# Patient Record
(demographics unavailable — no encounter records)

---

## 2024-12-11 NOTE — HISTORY OF PRESENT ILLNESS
[FreeTextEntry1] : 41 y/o female for annual  c/o aub bleedinhg x 3-5 days between epriods no pain  has IUD  upreg neg [Y] : Patient uses contraception [IUD] : has an intrauterine device [Mammogramdate] : will order [PapSmeardate] : 23

## 2024-12-11 NOTE — PHYSICAL EXAM
[Examination Of The Breasts] : a normal appearance [No Masses] : no breast masses were palpable [Labia Majora] : normal [Labia Minora] : normal [Normal] : normal [Uterine Adnexae] : normal [FreeTextEntry4] : iud string easily seen

## 2025-01-15 NOTE — HISTORY OF PRESENT ILLNESS
[FreeTextEntry1] : 39 y/o female for f/u aub  pt reports no longer banl bleeding  bx secretory tvs normal hb a1c pre-diabetic told to pt  pt has eye redness-will give meds-if no improvement in 5-7 days needs optho appt-pt understands and agrees

## 2025-02-26 NOTE — HISTORY OF PRESENT ILLNESS
[FreeTextEntry1] : Idalmis is a 41 y/o F who is here for new dx of Right ADH, FEA, and PASH. Pt speaks Azeri and is using an  ([id number] Mr. Mora) Pt denies feeling any palpable mass by herself. Denies nipple discharge and skin changes.   No FHx of breast and ovarian cancer  Her imaging is as follows: 01/23/2025 - B/L Mammo--> BIRADS0 -breasts are heterogeneously dense, which may obscure small masses. -There is an oval mass with associated calcifications seen in the upper outer quadrant of the right breast. -There is an asymmetry seen in the CC view only seen in the medial right breast at middle third, depth region. -There is an asymmetry seen in the MLO view only seen in the superior left breast.  01/31/2025 - B/L Dx Mammo and US--> BIRADS4 -breasts are heterogeneously dense, which may obscure small masses. Right breast: -Group of heterogeneous calcifications in the upper outer quadrant of the right breast is suspicious. Stereotactic biopsy is recommended. -The previously identified asymmetry in the medial right breast effaces upon spot compression, consistent with superimposed normal fibroglandular tissue. Left breast: -The previously identified superior left breast asymmetry persists upon spot compression. Short interval follow-up is recommended. Recommendation: Stereotactic guided biopsy.  02/14/2025 Right Stero Bx Breast, right calcifications, stereotactic guided vacuum-assisted needle core biopsies:  ([employer] mini-cork) - Mucocele-like lesion, 6.0 mm (microscopic measurement) in this biopsy material, associated with both atypical ductal hyperplasia (ADH) and flat epithelial atypia (FEA), along with phosphate microcalcifications. - Pseudoangiomatous stromal hyperplasia (PASH) and proliferative type fibrocystic changes. - Radiographic/pathologic correlation with the specimen's digital image reviewed on PACS is performed. Findings are benign high risk concordant.

## 2025-02-26 NOTE — PAST MEDICAL HISTORY
[Menarche Age ____] : age at menarche was [unfilled] [Total Preg ___] : G[unfilled] [Age At Live Birth ___] : Age at live birth: [unfilled] [FreeTextEntry7] : NONE [FreeTextEntry8] : YES

## 2025-02-26 NOTE — ASSESSMENT
[FreeTextEntry1] : Idalmis is a 39 y/o F who is here for new dx of Right ADH, FEA, and PASH. On physical exam, there are no discrete masses in either breast or axilla. There is no nipple discharge or inversion bilaterally. There are no skin changes bilaterally. Her pathology results were reviewed. We discussed atypical ductal hyperplasia. This is not cancer but a precancerous lesion. These are high-risk lesions and can be associated with increased risk of breast cancer. The recommendation is for surgical excision. Since this is not readily palpable, it will need to be localized preoperatively with a smart clip placement to detect the exact location of the lesion before the day of her surgery.  The benefits and risks of the lumpectomy procedure were explained to the patient, including but not limited to bleeding, infection, seroma and/or hematoma formation, pain, numbness of skin, scarring. She is aware that she will meet with the pre-surgical department here at the hospital for pre-surgery testing. She is also aware that she will likely need to meet with her primary care physician, and/or other medical specialists to obtain clearance for surgery, and to contact them appropriately. For further management, pt will f/u with imaging every 6 months after the surgery.  Total time on encounter: 45 mins PLAN: -Right lumpectomy with tag

## 2025-02-26 NOTE — END OF VISIT
[FreeTextEntry3] : All medical record entries made by the yue were at my, CHIOMA Balderas, direction and personally dictated by me on 02/26/2025. I have reviewed the chart and agreed that the record accurately reflects my personal performance of the history, physical examination, assessment and plan. I have also personally directed, reviewed and agreed with the chart.

## 2025-02-26 NOTE — ADDENDUM
[FreeTextEntry1] :  Documented by Estrada Benz acting as a scribe for Dr. BLAND [facility] on 02/26/2025.

## 2025-02-26 NOTE — DATA REVIEWED
[FreeTextEntry1] : ACC: [id number]     EXAM:  MG MAMMO SCREEN W GER BI#   ORDERED BY: CLARISSA SCHAFER  PROCEDURE DATE:  01/23/2025   INTERPRETATION:  HISTORY: Bilateral MG MAMMO SCREEN W GER BI# was performed. Patient is 40 years old and is seen for screening. The patient has no personal history of cancer.  The patient has no family history of breast cancer.  RISK ASSESSMENT: NCI Lifetime Risk: 9.9 Tyrer-zick Lifetime Risk: 12.1  CLINICAL BREAST EXAM: The patient reports their last clinical breast exam was performed within the past year.  COMPARISON STUDIES: No prior imaging studies are available for comparison.  MAMMOGRAM FINDINGS: Mammography was performed including the following views: bilateral craniocaudal with tomosynthesis, bilateral mediolateral oblique with tomosynthesis.  The examination includes digital synthetic 2D and digital tomosynthesis 3D images. Additional imaging analysis was performed using CAD (computer-aided detection) software.  The breasts are heterogeneously dense, which may obscure small masses.  Finding 1:  There is an oval mass with associated calcifications seen in the upper outer quadrant of the right breast.  Finding 2:  There is an asymmetry seen in the CC view only seen in the medial right breast at middle third, depth region.  Finding 3:  There is an asymmetry seen in the MLO view only seen in the superior left breast.  IMPRESSION: Finding 1:  Mass in the upper outer quadrant of the right breast requires additional evaluation.  RECOMMENDATION: Patient will be recalled for additional mammographic views and breast ultrasound.  Finding 2:  Asymmetry in the medial right breast requires additional evaluation.  RECOMMENDATION: Patient will be recalled for additional mammographic views and breast ultrasound.  Finding 3:  Asymmetry in the left breast requires additional evaluation.  RECOMMENDATION: Patient will be recalled for additional mammographic views and breast ultrasound.  ASSESSMENT: BI-RADS Category 0:  Incomplete: Needs Additional Imaging Evaluation  The patient will be notified of these results by telephone, and will also be mailed a written summary in layman's terms.    ACC: [id number]     EXAM:  MG US BREAST COMPLETE BI   ORDERED BY: CLARISSA SCHAFER  ACC: [id number]     EXAM:  MG MAMMO DIAG W GER BI#   ORDERED BY: CLARISSA SCHAFER  PROCEDURE DATE:  01/31/2025   INTERPRETATION:  Clinical History/Reason for Exam: Follow-up right breast mass and asymmetry, left breast asymmetry identified on screening mammogram 1/23/2025.  The patient reports last clinical breast examination was performed unknown.  Family history of breast cancer: There is no family history of breast cancer.  Comparisons: Screening mammogram 1/23/2025.  Views obtained: bilateral views, digital tomosynthesis images were obtained as well as spot and magnification views.  Computer-aided detection was utilized in the interpretation of this examination.  Breast composition: The breasts are heterogeneously dense, which may obscure small masses.  Findings:  Mammogram:  Right breast:  Group of heterogeneous calcifications in the upper outer quadrant of the right breast is suspicious. Stereotactic biopsy is recommended.  The previously identified asymmetry in the medial right breast effaces upon spot compression, consistent with superimposed normal fibroglandular tissue.  Left breast:  The previously identified superior left breast asymmetry persists upon spot compression. Short interval follow-up is recommended.  Ultrasound:  Bilateral whole breast ultrasound was performed.  There is no solid or cystic mass noted in either breast. No right or left axillary lymphadenopathy.  Impression:  Calcifications in the upper outer quadrant of the right breast are suspicious. Stereotactic biopsy is recommended.  The previously identified superior left breast asymmetry persists upon spot compression. Short interval follow-up is recommended pending biopsy results.  Recommendation: Stereotactic guided biopsy.  BI-RADS Category 4: Suspicious  Breast Arterial Calcification (MONICO): Grade 0 - No vascular calcifications. Note: The absence of breast arterial calcification does not exclude cardiovascular disease. Management of cardiovascular risk factors should be based clinically.  The above findings and recommendations were discussed with the patient at the time of the examination.  --- End of Report ---    	 ACC: [id number]     EXAM:  MG MAMMO DIAG POST PROC RT   ORDERED BY: CLARISSA ALFOSNOS  ACC: [id number]     EXAM:  MG STEREO BX 1ST RT SISC   ORDERED BY: CLARISSA SCHAFER  *** ADDENDUM # 1 ***  FINAL DIAGNOSIS BREAST, RIGHT CALCIFICATIONS, STEREOTACTIC GUIDED VACUUM-ASSISTED NEEDLE CORE BIOPSIES:  - MUCOCELE-LIKE LESION, 6.0 MM (MICROSCOPIC MEASUREMENT) IN THIS BIOPSY MATERIAL, ASSOCIATED WITH BOTH ATYPICAL DUCTAL HYPERPLASIA (ADH) AND FLAT EPITHELIAL ATYPIA (FEA), ALONG WITH PHOSPHATE MICROCALCIFICATIONS. - PSEUDOANGIOMATOUS STROMAL HYPERPLASIA (PASH) AND PROLIFERATIVE TYPE FIBROCYSTIC CHANGES.  Findings are benign high risk concordant.  Recommendations: Surgical management is recommended.  Findings and recommendations were discussed with the patient through  Steph Rich on 2/19/2025.  --- End of Report ---  *** END OF ADDENDUM # 1 ***   PROCEDURE DATE:  02/14/2025    INTERPRETATION:  Clinical history: Right breast calcifications.  Images and reports were reviewed. Consent was obtained following a discussion of risks and benefits of the procedure as well as questioning about patient allergies. The patient safety checklist, including time out procedure, was used.  The right breast was compressed and stereo and tomographic images were obtained to locate the grouped heterogeneous calcifications. The calcifications were located in the upper outer quadrant of the breast. A lateral approach was used. The area was prepped and draped in the normal sterile fashion.  The skin was anesthetized with 5 mL buffered 1% lidocaine. A small skin incision was made.  Through the incision, using a biopsy gun, a 9 gauge needle was introduced and pre-and post-fire stereo images were obtained. Deep anesthesia was given with 10 mL 1% lidocaine with epinephrine. Numerous specimens were taken. The specimens were x-rayed and several of them contain microcalcifications.  Through the needle a radiopaque marker ([employer] mini-cork) was deposited.  The needle was removed and the breast was compressed to achieve hemostasis. The patient tolerated the procedure well and there was no immediate post procedure complication.  The specimens were  into those that contain calcifications and those that do not contain calcifications and were put in different containers and sent to pathology.  A unilateral mammogram was performed: VIEWS: CC and ML views of the right breast. BREAST COMPOSITION: The breasts are heterogeneously dense, which may obscure small masses. FINDINGS: The biopsy clip placed during the stereotactic guided biopsy is in the anticipated location in the right breast. FINAL ASSESSMENT Category: Post Procedure Mammogram for Marker Placement  IMPRESSION:  Successful stereotactic core biopsy of the right breast.  Histology: Pending, to be reported in an addendum.  -

## 2025-02-26 NOTE — HISTORY OF PRESENT ILLNESS
[FreeTextEntry1] : Idalmis is a 39 y/o F who is here for new dx of Right ADH, FEA, and PASH. Pt speaks Turkish and is using an  ([id number] Mr. Mora) Pt denies feeling any palpable mass by herself. Denies nipple discharge and skin changes.   No FHx of breast and ovarian cancer  Her imaging is as follows: 01/23/2025 - B/L Mammo--> BIRADS0 -breasts are heterogeneously dense, which may obscure small masses. -There is an oval mass with associated calcifications seen in the upper outer quadrant of the right breast. -There is an asymmetry seen in the CC view only seen in the medial right breast at middle third, depth region. -There is an asymmetry seen in the MLO view only seen in the superior left breast.  01/31/2025 - B/L Dx Mammo and US--> BIRADS4 -breasts are heterogeneously dense, which may obscure small masses. Right breast: -Group of heterogeneous calcifications in the upper outer quadrant of the right breast is suspicious. Stereotactic biopsy is recommended. -The previously identified asymmetry in the medial right breast effaces upon spot compression, consistent with superimposed normal fibroglandular tissue. Left breast: -The previously identified superior left breast asymmetry persists upon spot compression. Short interval follow-up is recommended. Recommendation: Stereotactic guided biopsy.  02/14/2025 Right Stero Bx Breast, right calcifications, stereotactic guided vacuum-assisted needle core biopsies:  ([employer] mini-cork) - Mucocele-like lesion, 6.0 mm (microscopic measurement) in this biopsy material, associated with both atypical ductal hyperplasia (ADH) and flat epithelial atypia (FEA), along with phosphate microcalcifications. - Pseudoangiomatous stromal hyperplasia (PASH) and proliferative type fibrocystic changes. - Radiographic/pathologic correlation with the specimen's digital image reviewed on PACS is performed. Findings are benign high risk concordant.

## 2025-02-26 NOTE — ADDENDUM
[FreeTextEntry1] :  Documented by Estrada Benz acting as a scribe for Dr. BLAND [facility] on 02/26/2025.

## 2025-02-26 NOTE — DATA REVIEWED
[FreeTextEntry1] : ACC: [id number]     EXAM:  MG MAMMO SCREEN W GER BI#   ORDERED BY: CLARISSA SCHAFER  PROCEDURE DATE:  01/23/2025   INTERPRETATION:  HISTORY: Bilateral MG MAMMO SCREEN W GER BI# was performed. Patient is 40 years old and is seen for screening. The patient has no personal history of cancer.  The patient has no family history of breast cancer.  RISK ASSESSMENT: NCI Lifetime Risk: 9.9 Tyrer-zick Lifetime Risk: 12.1  CLINICAL BREAST EXAM: The patient reports their last clinical breast exam was performed within the past year.  COMPARISON STUDIES: No prior imaging studies are available for comparison.  MAMMOGRAM FINDINGS: Mammography was performed including the following views: bilateral craniocaudal with tomosynthesis, bilateral mediolateral oblique with tomosynthesis.  The examination includes digital synthetic 2D and digital tomosynthesis 3D images. Additional imaging analysis was performed using CAD (computer-aided detection) software.  The breasts are heterogeneously dense, which may obscure small masses.  Finding 1:  There is an oval mass with associated calcifications seen in the upper outer quadrant of the right breast.  Finding 2:  There is an asymmetry seen in the CC view only seen in the medial right breast at middle third, depth region.  Finding 3:  There is an asymmetry seen in the MLO view only seen in the superior left breast.  IMPRESSION: Finding 1:  Mass in the upper outer quadrant of the right breast requires additional evaluation.  RECOMMENDATION: Patient will be recalled for additional mammographic views and breast ultrasound.  Finding 2:  Asymmetry in the medial right breast requires additional evaluation.  RECOMMENDATION: Patient will be recalled for additional mammographic views and breast ultrasound.  Finding 3:  Asymmetry in the left breast requires additional evaluation.  RECOMMENDATION: Patient will be recalled for additional mammographic views and breast ultrasound.  ASSESSMENT: BI-RADS Category 0:  Incomplete: Needs Additional Imaging Evaluation  The patient will be notified of these results by telephone, and will also be mailed a written summary in layman's terms.    ACC: [id number]     EXAM:  MG US BREAST COMPLETE BI   ORDERED BY: CLARISSA SCHAFER  ACC: [id number]     EXAM:  MG MAMMO DIAG W GER BI#   ORDERED BY: CLARISSA SCHAFER  PROCEDURE DATE:  01/31/2025   INTERPRETATION:  Clinical History/Reason for Exam: Follow-up right breast mass and asymmetry, left breast asymmetry identified on screening mammogram 1/23/2025.  The patient reports last clinical breast examination was performed unknown.  Family history of breast cancer: There is no family history of breast cancer.  Comparisons: Screening mammogram 1/23/2025.  Views obtained: bilateral views, digital tomosynthesis images were obtained as well as spot and magnification views.  Computer-aided detection was utilized in the interpretation of this examination.  Breast composition: The breasts are heterogeneously dense, which may obscure small masses.  Findings:  Mammogram:  Right breast:  Group of heterogeneous calcifications in the upper outer quadrant of the right breast is suspicious. Stereotactic biopsy is recommended.  The previously identified asymmetry in the medial right breast effaces upon spot compression, consistent with superimposed normal fibroglandular tissue.  Left breast:  The previously identified superior left breast asymmetry persists upon spot compression. Short interval follow-up is recommended.  Ultrasound:  Bilateral whole breast ultrasound was performed.  There is no solid or cystic mass noted in either breast. No right or left axillary lymphadenopathy.  Impression:  Calcifications in the upper outer quadrant of the right breast are suspicious. Stereotactic biopsy is recommended.  The previously identified superior left breast asymmetry persists upon spot compression. Short interval follow-up is recommended pending biopsy results.  Recommendation: Stereotactic guided biopsy.  BI-RADS Category 4: Suspicious  Breast Arterial Calcification (MONICO): Grade 0 - No vascular calcifications. Note: The absence of breast arterial calcification does not exclude cardiovascular disease. Management of cardiovascular risk factors should be based clinically.  The above findings and recommendations were discussed with the patient at the time of the examination.  --- End of Report ---    	 ACC: [id number]     EXAM:  MG MAMMO DIAG POST PROC RT   ORDERED BY: CLARISSA ALFONSOS  ACC: [id number]     EXAM:  MG STEREO BX 1ST RT SISC   ORDERED BY: LCARISSA SCHAFER  *** ADDENDUM # 1 ***  FINAL DIAGNOSIS BREAST, RIGHT CALCIFICATIONS, STEREOTACTIC GUIDED VACUUM-ASSISTED NEEDLE CORE BIOPSIES:  - MUCOCELE-LIKE LESION, 6.0 MM (MICROSCOPIC MEASUREMENT) IN THIS BIOPSY MATERIAL, ASSOCIATED WITH BOTH ATYPICAL DUCTAL HYPERPLASIA (ADH) AND FLAT EPITHELIAL ATYPIA (FEA), ALONG WITH PHOSPHATE MICROCALCIFICATIONS. - PSEUDOANGIOMATOUS STROMAL HYPERPLASIA (PASH) AND PROLIFERATIVE TYPE FIBROCYSTIC CHANGES.  Findings are benign high risk concordant.  Recommendations: Surgical management is recommended.  Findings and recommendations were discussed with the patient through  Steph Rich on 2/19/2025.  --- End of Report ---  *** END OF ADDENDUM # 1 ***   PROCEDURE DATE:  02/14/2025    INTERPRETATION:  Clinical history: Right breast calcifications.  Images and reports were reviewed. Consent was obtained following a discussion of risks and benefits of the procedure as well as questioning about patient allergies. The patient safety checklist, including time out procedure, was used.  The right breast was compressed and stereo and tomographic images were obtained to locate the grouped heterogeneous calcifications. The calcifications were located in the upper outer quadrant of the breast. A lateral approach was used. The area was prepped and draped in the normal sterile fashion.  The skin was anesthetized with 5 mL buffered 1% lidocaine. A small skin incision was made.  Through the incision, using a biopsy gun, a 9 gauge needle was introduced and pre-and post-fire stereo images were obtained. Deep anesthesia was given with 10 mL 1% lidocaine with epinephrine. Numerous specimens were taken. The specimens were x-rayed and several of them contain microcalcifications.  Through the needle a radiopaque marker ([employer] mini-cork) was deposited.  The needle was removed and the breast was compressed to achieve hemostasis. The patient tolerated the procedure well and there was no immediate post procedure complication.  The specimens were  into those that contain calcifications and those that do not contain calcifications and were put in different containers and sent to pathology.  A unilateral mammogram was performed: VIEWS: CC and ML views of the right breast. BREAST COMPOSITION: The breasts are heterogeneously dense, which may obscure small masses. FINDINGS: The biopsy clip placed during the stereotactic guided biopsy is in the anticipated location in the right breast. FINAL ASSESSMENT Category: Post Procedure Mammogram for Marker Placement  IMPRESSION:  Successful stereotactic core biopsy of the right breast.  Histology: Pending, to be reported in an addendum.  -

## 2025-05-05 NOTE — HISTORY OF PRESENT ILLNESS
[FreeTextEntry1] : Idalmis is a 39 y/o F who is here POST OP s/p Right lumpectomy for ADH from 4/22/25.  Pt speaks Macedonian and is using an  ([id number] Ms. Llanos) Pt states of healing well.   Her imaging is as follows: 01/23/2025 - B/L Mammo--> BIRADS0 -breasts are heterogeneously dense, which may obscure small masses. -There is an oval mass with associated calcifications seen in the upper outer quadrant of the right breast. -There is an asymmetry seen in the CC view only seen in the medial right breast at middle third, depth region. -There is an asymmetry seen in the MLO view only seen in the superior left breast.  01/31/2025 - B/L Dx Mammo and US--> BIRADS4 -breasts are heterogeneously dense, which may obscure small masses. Right breast: -Group of heterogeneous calcifications in the upper outer quadrant of the right breast is suspicious. Stereotactic biopsy is recommended. -The previously identified asymmetry in the medial right breast effaces upon spot compression, consistent with superimposed normal fibroglandular tissue. Left breast: -The previously identified superior left breast asymmetry persists upon spot compression. Short interval follow-up is recommended. Recommendation: Stereotactic guided biopsy.  02/14/2025 Right Stero Bx Breast, right calcifications, stereotactic guided vacuum-assisted needle core biopsies: ([employer] mini-cork) - Mucocele-like lesion, 6.0 mm (microscopic measurement) in this biopsy material, associated with both atypical ductal hyperplasia (ADH) and flat epithelial atypia (FEA), along with phosphate microcalcifications. - Pseudoangiomatous stromal hyperplasia (PASH) and proliferative type fibrocystic changes. - Radiographic/pathologic correlation with the specimen's digital image reviewed on PACS is performed. Findings are benign high risk concordant.  04/22/2025 Pathology  Breast, right mass, seed localized lumpectomy: - Mucocele-like lesion, 5.0 mm, associated with both atypical ductal hyperplasia (ADH) and phosphate microcalcifications; located adjacent to prior biopsy site changes and 8.5 mm from the nearest superior surgical margin (microscopic measurements). - Fibroadenoma, apocrine metaplasia-lined cysts, mammary duct ectasia, pseudoangiomatous stromal hyperplasia (PASH), focal fibroadenomatoid mastopathy (sclerosing lobular hyperplasia), and proliferative type fibrocystic changes associated with phosphate microcalcifications present in small ductules.

## 2025-05-05 NOTE — ADDENDUM
[FreeTextEntry1] :  Documented by Estrada Benz acting as a scribe for Dr. BLAND CHIOMA on 05/05/2025.

## 2025-05-05 NOTE — ASSESSMENT
[FreeTextEntry1] : Idalmis is a 41 y/o F who is here POST OP s/p Right lumpectomy for ADH from 4/22/25.  Pt is doing well. The pathology results were discussed with the pt. It was ADH. No radiation or systemic therapy are needed. Since ADH is considered as a pre-cancerous lesion, will incorporate bilateral breast MRI and mammogram in an alternative fashion every 6 months to monitor it. we discussed the chemoprevention as well. she has decided to proceed with MRI  PLAN: -Breast MRI in 6 months -F/u after imaging with the PA

## 2025-05-05 NOTE — HISTORY OF PRESENT ILLNESS
[FreeTextEntry1] : Idalmis is a 41 y/o F who is here POST OP s/p Right lumpectomy for ADH from 4/22/25.  Pt speaks Irish and is using an  ([id number] Ms. Llanos) Pt states of healing well.   Her imaging is as follows: 01/23/2025 - B/L Mammo--> BIRADS0 -breasts are heterogeneously dense, which may obscure small masses. -There is an oval mass with associated calcifications seen in the upper outer quadrant of the right breast. -There is an asymmetry seen in the CC view only seen in the medial right breast at middle third, depth region. -There is an asymmetry seen in the MLO view only seen in the superior left breast.  01/31/2025 - B/L Dx Mammo and US--> BIRADS4 -breasts are heterogeneously dense, which may obscure small masses. Right breast: -Group of heterogeneous calcifications in the upper outer quadrant of the right breast is suspicious. Stereotactic biopsy is recommended. -The previously identified asymmetry in the medial right breast effaces upon spot compression, consistent with superimposed normal fibroglandular tissue. Left breast: -The previously identified superior left breast asymmetry persists upon spot compression. Short interval follow-up is recommended. Recommendation: Stereotactic guided biopsy.  02/14/2025 Right Stero Bx Breast, right calcifications, stereotactic guided vacuum-assisted needle core biopsies: (What [employer] mini-cork) - Mucocele-like lesion, 6.0 mm (microscopic measurement) in this biopsy material, associated with both atypical ductal hyperplasia (ADH) and flat epithelial atypia (FEA), along with phosphate microcalcifications. - Pseudoangiomatous stromal hyperplasia (PASH) and proliferative type fibrocystic changes. - Radiographic/pathologic correlation with the specimen's digital image reviewed on PACS is performed. Findings are benign high risk concordant.  04/22/2025 Pathology  Breast, right mass, seed localized lumpectomy: - Mucocele-like lesion, 5.0 mm, associated with both atypical ductal hyperplasia (ADH) and phosphate microcalcifications; located adjacent to prior biopsy site changes and 8.5 mm from the nearest superior surgical margin (microscopic measurements). - Fibroadenoma, apocrine metaplasia-lined cysts, mammary duct ectasia, pseudoangiomatous stromal hyperplasia (PASH), focal fibroadenomatoid mastopathy (sclerosing lobular hyperplasia), and proliferative type fibrocystic changes associated with phosphate microcalcifications present in small ductules.

## 2025-05-05 NOTE — DATA REVIEWED
[FreeTextEntry1] : Kg Accession Number : [id number] Patient:     DASH PETERS   Accession:                             [id number]  Collected Date/Time:                   4/22/2025 08:00 EDT Received Date/Time:                    4/22/2025 08:15 EDT  Surgical Pathology Report - Auth (Verified)  Specimen(s) Submitted Right breast lumpectomy Time obtained: 8:00 am Cold ischemic time <1 hour  Final Diagnosis Breast, right mass, seed localized lumpectomy: - Mucocele-like lesion, 5.0 mm, associated with both atypical ductal  hyperplasia (ADH) and phosphate microcalcifications; located adjacent to prior biopsy site changes and 8.5 mm from the nearest superior surgical margin (microscopic measurements). - Fibroadenoma, apocrine metaplasia-lined cysts, mammary duct ectasia, pseudoangiomatous stromal hyperplasia (PASH), focal fibroadenomatoid mastopathy (sclerosing lobular hyperplasia), and proliferative type fibrocystic changes associated with phosphate microcalcifications present in small ductules. Verified by: Kike Blackwell M.D. (Electronic Signature) Reported on: 04/25/25 11:02 EDT, [facility], [address] [place] [address] Phone: [phone]   Fax: [phone] _________________________________________________________________   Clinical Information Right lumpectomy  Perioperative Diagnosis Right ADH  Postoperative Diagnosis Right ADH   Gross Description The specimen is received fresh, labeled "right breast mass short suture superior, long lateral" and consists of a breast lumpectomy specimen weighing 20 g and measuring 5 cm anterior to posterior, 3 cm medial to lateral and 1.5 cm superior to inferior. The specimen is oriented by sutures as follows: short superior and long lateral. The specimen is inked as follows: superior - blue, inferior - green, lateral - yellow, medial - orange, anterior - red, posterior - black. The specimen is serially sectioned from anterior to posterior. The cut surface of the breast tissue appears homogeneous yellow and lobulated with tan, white fibrocystic changes.  The specimen is entirely submitted.  Summary of Sections: 1A-perpendicular sections-anterior margin-1 1B-full section-1 1C-full section-1 1D-full section-1 1E-full section-1 1F-full section-1 1G-full section-1 1H-full section-1 1I-1J-full section-2 1E-2H-[id number] sections-posterior margin-2

## 2025-05-05 NOTE — END OF VISIT
[FreeTextEntry3] : All medical record entries made by the yue were at my, CHIOMA Balderas, direction and personally dictated by me on 05/05/2025. I have reviewed the chart and agreed that the record accurately reflects my personal performance of the history, physical examination, assessment and plan. I have also personally directed, reviewed and agreed with the chart.

## 2025-05-05 NOTE — DATA REVIEWED
[FreeTextEntry1] : Kg Accession Number : [id number] Patient:     DASH PETERS   Accession:                             [id number]  Collected Date/Time:                   4/22/2025 08:00 EDT Received Date/Time:                    4/22/2025 08:15 EDT  Surgical Pathology Report - Auth (Verified)  Specimen(s) Submitted Right breast lumpectomy Time obtained: 8:00 am Cold ischemic time <1 hour  Final Diagnosis Breast, right mass, seed localized lumpectomy: - Mucocele-like lesion, 5.0 mm, associated with both atypical ductal  hyperplasia (ADH) and phosphate microcalcifications; located adjacent to prior biopsy site changes and 8.5 mm from the nearest superior surgical margin (microscopic measurements). - Fibroadenoma, apocrine metaplasia-lined cysts, mammary duct ectasia, pseudoangiomatous stromal hyperplasia (PASH), focal fibroadenomatoid mastopathy (sclerosing lobular hyperplasia), and proliferative type fibrocystic changes associated with phosphate microcalcifications present in small ductules. Verified by: Kike Blackwell M.D. (Electronic Signature) Reported on: 04/25/25 11:02 EDT, [facility], [address] [place] [address] Phone: [phone]   Fax: [phone] _________________________________________________________________   Clinical Information Right lumpectomy  Perioperative Diagnosis Right ADH  Postoperative Diagnosis Right ADH   Gross Description The specimen is received fresh, labeled "right breast mass short suture superior, long lateral" and consists of a breast lumpectomy specimen weighing 20 g and measuring 5 cm anterior to posterior, 3 cm medial to lateral and 1.5 cm superior to inferior. The specimen is oriented by sutures as follows: short superior and long lateral. The specimen is inked as follows: superior - blue, inferior - green, lateral - yellow, medial - orange, anterior - red, posterior - black. The specimen is serially sectioned from anterior to posterior. The cut surface of the breast tissue appears homogeneous yellow and lobulated with tan, white fibrocystic changes.  The specimen is entirely submitted.  Summary of Sections: 1A-perpendicular sections-anterior margin-1 1B-full section-1 1C-full section-1 1D-full section-1 1E-full section-1 1F-full section-1 1G-full section-1 1H-full section-1 1I-1J-full section-2 [id number] sections-posterior margin-2

## 2025-05-05 NOTE — ASSESSMENT
[FreeTextEntry1] : Idalmis is a 39 y/o F who is here POST OP s/p Right lumpectomy for ADH from 4/22/25.  Pt is doing well. The pathology results were discussed with the pt. It was ADH. No radiation or systemic therapy are needed. Since ADH is considered as a pre-cancerous lesion, will incorporate bilateral breast MRI and mammogram in an alternative fashion every 6 months to monitor it. we discussed the chemoprevention as well. she has decided to proceed with MRI  PLAN: -Breast MRI in 6 months -F/u after imaging with the PA